# Patient Record
Sex: FEMALE | Race: WHITE | NOT HISPANIC OR LATINO
[De-identification: names, ages, dates, MRNs, and addresses within clinical notes are randomized per-mention and may not be internally consistent; named-entity substitution may affect disease eponyms.]

---

## 2017-06-16 ENCOUNTER — APPOINTMENT (OUTPATIENT)
Dept: CARDIOLOGY | Facility: CLINIC | Age: 82
End: 2017-06-16

## 2017-12-15 ENCOUNTER — APPOINTMENT (OUTPATIENT)
Dept: CARDIOLOGY | Facility: CLINIC | Age: 82
End: 2017-12-15
Payer: MEDICARE

## 2017-12-15 VITALS
DIASTOLIC BLOOD PRESSURE: 60 MMHG | WEIGHT: 148 LBS | HEART RATE: 54 BPM | BODY MASS INDEX: 27.94 KG/M2 | HEIGHT: 61 IN | SYSTOLIC BLOOD PRESSURE: 130 MMHG | OXYGEN SATURATION: 97 %

## 2017-12-15 DIAGNOSIS — Z87.19 PERSONAL HISTORY OF OTHER DISEASES OF THE DIGESTIVE SYSTEM: ICD-10-CM

## 2017-12-15 DIAGNOSIS — F41.9 ANXIETY DISORDER, UNSPECIFIED: ICD-10-CM

## 2017-12-15 DIAGNOSIS — Z86.19 PERSONAL HISTORY OF OTHER INFECTIOUS AND PARASITIC DISEASES: ICD-10-CM

## 2017-12-15 DIAGNOSIS — Z78.9 OTHER SPECIFIED HEALTH STATUS: ICD-10-CM

## 2017-12-15 DIAGNOSIS — Z85.3 PERSONAL HISTORY OF MALIGNANT NEOPLASM OF BREAST: ICD-10-CM

## 2017-12-15 PROCEDURE — 99214 OFFICE O/P EST MOD 30 MIN: CPT

## 2017-12-15 RX ORDER — METOPROLOL SUCCINATE 25 MG/1
25 TABLET, EXTENDED RELEASE ORAL
Qty: 90 | Refills: 0 | Status: ACTIVE | COMMUNITY
Start: 2017-09-20

## 2017-12-15 RX ORDER — FULVESTRANT 50 MG/ML
250 INJECTION INTRAMUSCULAR
Qty: 10 | Refills: 0 | Status: ACTIVE | COMMUNITY
Start: 2017-10-23

## 2018-06-13 ENCOUNTER — NON-APPOINTMENT (OUTPATIENT)
Age: 83
End: 2018-06-13

## 2018-06-13 ENCOUNTER — APPOINTMENT (OUTPATIENT)
Dept: CARDIOLOGY | Facility: CLINIC | Age: 83
End: 2018-06-13
Payer: MEDICARE

## 2018-06-13 VITALS
WEIGHT: 152 LBS | HEIGHT: 61 IN | BODY MASS INDEX: 28.7 KG/M2 | SYSTOLIC BLOOD PRESSURE: 130 MMHG | HEART RATE: 61 BPM | DIASTOLIC BLOOD PRESSURE: 72 MMHG

## 2018-06-13 DIAGNOSIS — E78.5 HYPERLIPIDEMIA, UNSPECIFIED: ICD-10-CM

## 2018-06-13 DIAGNOSIS — Z86.79 PERSONAL HISTORY OF OTHER DISEASES OF THE CIRCULATORY SYSTEM: ICD-10-CM

## 2018-06-13 PROCEDURE — 93000 ELECTROCARDIOGRAM COMPLETE: CPT

## 2018-06-13 PROCEDURE — 99214 OFFICE O/P EST MOD 30 MIN: CPT

## 2018-11-02 ENCOUNTER — APPOINTMENT (OUTPATIENT)
Dept: CARDIOLOGY | Facility: CLINIC | Age: 83
End: 2018-11-02
Payer: MEDICARE

## 2018-11-02 VITALS
WEIGHT: 147 LBS | HEIGHT: 61 IN | SYSTOLIC BLOOD PRESSURE: 160 MMHG | BODY MASS INDEX: 27.75 KG/M2 | OXYGEN SATURATION: 95 % | HEART RATE: 63 BPM | DIASTOLIC BLOOD PRESSURE: 72 MMHG

## 2018-11-02 PROCEDURE — 99213 OFFICE O/P EST LOW 20 MIN: CPT

## 2018-11-02 NOTE — PHYSICAL EXAM
[General Appearance - Well Developed] : well developed [Normal Appearance] : normal appearance [Well Groomed] : well groomed [General Appearance - Well Nourished] : well nourished [No Deformities] : no deformities [General Appearance - In No Acute Distress] : no acute distress [Normal Conjunctiva] : the conjunctiva exhibited no abnormalities [Eyelids - No Xanthelasma] : the eyelids demonstrated no xanthelasmas [Normal Oral Mucosa] : normal oral mucosa [No Oral Pallor] : no oral pallor [No Oral Cyanosis] : no oral cyanosis [Normal Jugular Venous A Waves Present] : normal jugular venous A waves present [Normal Jugular Venous V Waves Present] : normal jugular venous V waves present [No Jugular Venous [place] A Waves] : no jugular venous [place] A waves [Respiration, Rhythm And Depth] : normal respiratory rhythm and effort [Exaggerated Use Of Accessory Muscles For Inspiration] : no accessory muscle use [Auscultation Breath Sounds / Voice Sounds] : lungs were clear to auscultation bilaterally [Heart Rate And Rhythm] : heart rate and rhythm were normal [Heart Sounds] : normal S1 and S2 [Murmurs] : no murmurs present [Abdomen Soft] : soft [Abdomen Tenderness] : non-tender [Abdomen Mass (___ Cm)] : no abdominal mass palpated [Abnormal Walk] : normal gait [Gait - Sufficient For Exercise Testing] : the gait was sufficient for exercise testing [Nail Clubbing] : no clubbing of the fingernails [Cyanosis, Localized] : no localized cyanosis [Petechial Hemorrhages (___cm)] : no petechial hemorrhages [Skin Color & Pigmentation] : normal skin color and pigmentation [] : no rash [No Venous Stasis] : no venous stasis [Skin Lesions] : no skin lesions [No Skin Ulcers] : no skin ulcer [No Xanthoma] : no  xanthoma was observed [Oriented To Time, Place, And Person] : oriented to person, place, and time [Affect] : the affect was normal [Mood] : the mood was normal [No Anxiety] : not feeling anxious

## 2018-11-02 NOTE — REASON FOR VISIT
[Follow-Up - From Hospitalization] : follow-up of a recent hospitalization for [Hypertension] : hypertension [Medication Management] : Medication management

## 2018-11-02 NOTE — DISCUSSION/SUMMARY
[Hypertension] : hypertension [Medication Changes Per Orders] : as documented in orders [Patient] : the patient [___ Month(s)] : [unfilled] month(s) [FreeTextEntry1] : 89F w/ PMH as above presenting for follow up from hospitalization.  Currently feeling better but BP is suboptimally controlled on current regimen.  She is taking asa.\par \par 1. Continue ASA daily\par 2. Increase amlodipine to 10 mg PO daily, continue lasix 20 mg PO daily, toprol 25 mg PO daily\par 3. Continue pravastatin 40 mg PO daily\par \par RTC 1-2 months for BP med titration

## 2018-11-02 NOTE — HISTORY OF PRESENT ILLNESS
[FreeTextEntry1] : 89F w/ PMH of HTN, CAD, breast CA, GERD presenting for follow up after hospitalization for high BP and possible TIA.  Patient has been feeling well since d/c.  BP spiked all the way up to 200's systolic.  Otherwise compliant with her medications.  Imaging of her brain was unremarkable and she was sent home.

## 2018-12-05 ENCOUNTER — APPOINTMENT (OUTPATIENT)
Dept: CARDIOLOGY | Facility: CLINIC | Age: 83
End: 2018-12-05

## 2018-12-19 ENCOUNTER — APPOINTMENT (OUTPATIENT)
Dept: CARDIOLOGY | Facility: CLINIC | Age: 83
End: 2018-12-19

## 2019-05-14 ENCOUNTER — CLINICAL ADVICE (OUTPATIENT)
Age: 84
End: 2019-05-14

## 2019-10-18 ENCOUNTER — NON-APPOINTMENT (OUTPATIENT)
Age: 84
End: 2019-10-18

## 2019-10-18 ENCOUNTER — APPOINTMENT (OUTPATIENT)
Dept: CARDIOLOGY | Facility: CLINIC | Age: 84
End: 2019-10-18
Payer: MEDICARE

## 2019-10-18 VITALS
WEIGHT: 133 LBS | DIASTOLIC BLOOD PRESSURE: 80 MMHG | SYSTOLIC BLOOD PRESSURE: 162 MMHG | HEIGHT: 60 IN | BODY MASS INDEX: 26.11 KG/M2 | OXYGEN SATURATION: 98 % | HEART RATE: 64 BPM

## 2019-10-18 DIAGNOSIS — R06.09 OTHER FORMS OF DYSPNEA: ICD-10-CM

## 2019-10-18 PROCEDURE — 93000 ELECTROCARDIOGRAM COMPLETE: CPT

## 2019-10-18 PROCEDURE — 99215 OFFICE O/P EST HI 40 MIN: CPT | Mod: 25

## 2019-10-18 RX ORDER — ASPIRIN 325 MG
325 TABLET ORAL DAILY
Refills: 0 | Status: DISCONTINUED | COMMUNITY
End: 2019-10-18

## 2019-10-18 RX ORDER — ALLOPURINOL 300 MG/1
300 TABLET ORAL
Refills: 0 | Status: DISCONTINUED | COMMUNITY
End: 2019-10-18

## 2019-10-18 RX ORDER — FUROSEMIDE 20 MG/1
20 TABLET ORAL
Refills: 0 | Status: DISCONTINUED | COMMUNITY
End: 2019-10-18

## 2019-10-18 RX ORDER — PRAVASTATIN SODIUM 40 MG/1
40 TABLET ORAL
Qty: 1 | Refills: 3 | Status: ACTIVE | COMMUNITY
Start: 2017-07-25 | End: 1900-01-01

## 2019-10-18 NOTE — DISCUSSION/SUMMARY
[Hypertension] : hypertension [Medication Changes Per Orders] : as documented in orders [___ Month(s)] : [unfilled] month(s) [FreeTextEntry1] : 89F w/ PMH as above presenting for follow up.  Rather new onset dyspnea on exertion.\par \par 1. Continue ASA daily\par 2. Continue amlodipine to 10 mg PO daily, continue torsemide 5mg daily, Toprol XL 25mg daily (high risk medication with no signs of toxicity)\par 3. Continue pravastatin 40 mg PO daily and Zetia 10mg daily.\par 4. Will order echocardiogram for dyspnea.\par \par RTC 6 months.

## 2019-10-18 NOTE — PHYSICAL EXAM
[General Appearance - Well Developed] : well developed [Well Groomed] : well groomed [Normal Appearance] : normal appearance [General Appearance - Well Nourished] : well nourished [General Appearance - In No Acute Distress] : no acute distress [No Deformities] : no deformities [Normal Conjunctiva] : the conjunctiva exhibited no abnormalities [Eyelids - No Xanthelasma] : the eyelids demonstrated no xanthelasmas [No Oral Cyanosis] : no oral cyanosis [No Oral Pallor] : no oral pallor [Normal Oral Mucosa] : normal oral mucosa [Normal Jugular Venous A Waves Present] : normal jugular venous A waves present [Normal Jugular Venous V Waves Present] : normal jugular venous V waves present [No Jugular Venous [place] A Waves] : no jugular venous [place] A waves [Respiration, Rhythm And Depth] : normal respiratory rhythm and effort [Exaggerated Use Of Accessory Muscles For Inspiration] : no accessory muscle use [Auscultation Breath Sounds / Voice Sounds] : lungs were clear to auscultation bilaterally [Heart Sounds] : normal S1 and S2 [Heart Rate And Rhythm] : heart rate and rhythm were normal [Murmurs] : no murmurs present [Abdomen Mass (___ Cm)] : no abdominal mass palpated [Gait - Sufficient For Exercise Testing] : the gait was sufficient for exercise testing [Abnormal Walk] : normal gait [Nail Clubbing] : no clubbing of the fingernails [Cyanosis, Localized] : no localized cyanosis [Skin Color & Pigmentation] : normal skin color and pigmentation [Petechial Hemorrhages (___cm)] : no petechial hemorrhages [] : no rash [No Venous Stasis] : no venous stasis [Skin Lesions] : no skin lesions [No Xanthoma] : no  xanthoma was observed [No Skin Ulcers] : no skin ulcer [Affect] : the affect was normal [Oriented To Time, Place, And Person] : oriented to person, place, and time [No Anxiety] : not feeling anxious [Mood] : the mood was normal

## 2019-10-18 NOTE — HISTORY OF PRESENT ILLNESS
[FreeTextEntry1] : 89F w/ PMH of HTN, CAD, breast CA, GERD, possible TIA presenting for follow up. She has no chest pain or palpitations. Compliant with medications and reports no adverse effects. She has noticed increased SOB on moderate exertional activity. Occurred over last two weeks. Not getting any better or worse. No SOB at rest and no orthopnea.

## 2019-10-18 NOTE — REASON FOR VISIT
[Follow-Up - From Hospitalization] : follow-up of a recent hospitalization for [Medication Management] : Medication management [Hypertension] : hypertension

## 2019-10-25 ENCOUNTER — APPOINTMENT (OUTPATIENT)
Dept: CARDIOLOGY | Facility: CLINIC | Age: 84
End: 2019-10-25
Payer: MEDICARE

## 2019-10-25 PROCEDURE — 93306 TTE W/DOPPLER COMPLETE: CPT

## 2019-10-28 ENCOUNTER — APPOINTMENT (OUTPATIENT)
Dept: CARDIOLOGY | Facility: CLINIC | Age: 84
End: 2019-10-28

## 2020-02-21 RX ORDER — AMLODIPINE BESYLATE 10 MG/1
10 TABLET ORAL
Qty: 90 | Refills: 3 | Status: ACTIVE | COMMUNITY
Start: 2017-11-01 | End: 1900-01-01

## 2020-04-16 ENCOUNTER — APPOINTMENT (OUTPATIENT)
Dept: CARDIOLOGY | Facility: CLINIC | Age: 85
End: 2020-04-16
Payer: MEDICARE

## 2020-04-16 VITALS
DIASTOLIC BLOOD PRESSURE: 74 MMHG | BODY MASS INDEX: 25.52 KG/M2 | HEIGHT: 60 IN | SYSTOLIC BLOOD PRESSURE: 149 MMHG | WEIGHT: 130 LBS | HEART RATE: 72 BPM

## 2020-04-16 DIAGNOSIS — Z86.79 PERSONAL HISTORY OF OTHER DISEASES OF THE CIRCULATORY SYSTEM: ICD-10-CM

## 2020-04-16 DIAGNOSIS — I25.10 ATHEROSCLEROTIC HEART DISEASE OF NATIVE CORONARY ARTERY W/OUT ANGINA PECTORIS: ICD-10-CM

## 2020-04-16 DIAGNOSIS — Z79.899 OTHER LONG TERM (CURRENT) DRUG THERAPY: ICD-10-CM

## 2020-04-16 DIAGNOSIS — I27.20 PULMONARY HYPERTENSION, UNSPECIFIED: ICD-10-CM

## 2020-04-16 DIAGNOSIS — E78.5 HYPERLIPIDEMIA, UNSPECIFIED: ICD-10-CM

## 2020-04-16 PROCEDURE — 99443: CPT

## 2020-04-16 RX ORDER — PANTOPRAZOLE 20 MG/1
20 TABLET, DELAYED RELEASE ORAL DAILY
Refills: 0 | Status: ACTIVE | COMMUNITY

## 2020-04-16 RX ORDER — PANTOPRAZOLE 40 MG/1
40 TABLET, DELAYED RELEASE ORAL
Qty: 90 | Refills: 0 | Status: DISCONTINUED | COMMUNITY
Start: 2017-07-25 | End: 2020-04-16

## 2020-04-16 RX ORDER — NYSTATIN 100000 [USP'U]/G
100000 CREAM TOPICAL
Qty: 30 | Refills: 0 | Status: DISCONTINUED | COMMUNITY
Start: 2017-06-13 | End: 2020-04-16

## 2020-04-16 RX ORDER — NYSTATIN 100000 [USP'U]/G
100000 POWDER TOPICAL
Qty: 60 | Refills: 0 | Status: DISCONTINUED | COMMUNITY
Start: 2017-06-13 | End: 2020-04-16

## 2020-04-16 RX ORDER — MAGNESIUM OXIDE 500 MG
500 TABLET ORAL DAILY
Refills: 0 | Status: ACTIVE | COMMUNITY

## 2020-04-16 RX ORDER — ACETAMINOPHEN/DIPHENHYDRAMINE 500MG-25MG
1000 TABLET ORAL DAILY
Refills: 0 | Status: ACTIVE | COMMUNITY

## 2020-04-16 RX ORDER — LIOTHYRONINE SODIUM 5 UG/1
5 TABLET ORAL DAILY
Refills: 0 | Status: ACTIVE | COMMUNITY
Start: 2017-09-12

## 2020-04-16 RX ORDER — TORSEMIDE 5 MG/1
5 TABLET ORAL EVERY OTHER DAY
Refills: 0 | Status: ACTIVE | COMMUNITY
Start: 2017-08-22

## 2020-04-16 RX ORDER — EZETIMIBE 10 MG/1
10 TABLET ORAL DAILY
Refills: 0 | Status: ACTIVE | COMMUNITY
Start: 2017-08-27

## 2020-04-16 RX ORDER — MINOCYCLINE HYDROCHLORIDE 50 MG/1
50 CAPSULE ORAL
Refills: 0 | Status: DISCONTINUED | COMMUNITY
End: 2020-04-16

## 2020-04-16 NOTE — DISCUSSION/SUMMARY
[FreeTextEntry1] : 89-year-old female with above medical history and active medical problems as noted below\par 1.  Essential hypertension.  Mildly elevated.  Continue to follow.  She will contact me if it remains persistently greater than 150/90.  Further adjustment of medications can be done.  Otherwise continue follow-up with hematology oncology.  Present regimen of medications.  Low-salt diet.  And follow-up with me when COVID-19 crisis is over.\par 2.  Hyperlipidemia.  In presence of coronary artery disease.  Continue present regimen of medication which includes statin plus Zetia.\par 3.  Coronary artery disease.  Prior PCI.  No signs of unstable CAD.  Continue aspirin.  Continue statin therapy.  Continue management of risk factors.\par 4.  Bilateral pleural effusion.  Torsemide 5 mg.  As per discussion with her less likely to be congestive heart failure.  Preserved EF.  Normal pulmonary pressure.  Mild valvular regurgitation on the last echocardiogram.  Possible neoplastic disease cannot be ruled out.  Being followed with hematology oncology.  If needed torsemide can be increased for transudative pleural effusions.\par \par All the above were at length reviewed. Answered all the questions. Thank you very much for this kind referral. Please do not hesitate to give me a call for any question.\par Part of this transcription was done with voice recognition software and phonetically similar errors are common. I apologize for that. Please do not hesitate to call for any questions due to above.\par

## 2020-04-16 NOTE — HISTORY OF PRESENT ILLNESS
[Home] : at home, [unfilled] , at the time of the visit. [Medical Office: ([facility])___] : at the medical office located in  [FreeTextEntry1] : Consent: Patient consented to telephone visit.\par Time: A total of 25 minutes was spent in review of pertinent medical records, discussion with the patient, evaluation of patient problem, and coordination of a care plan as part of this telephone visit.\par Physical examination was not performed as a  the risk of COVID-19 cross-contamination to be greater than the benefit to the patient conferred by the physical examination.\par \par 89-year-old female who has not been seen by me in person.  Was seen by other physicians in the office.  I had a 2 way audio visit with her with extensive review of her information which includes labs and hematology/oncology consultation reports.\par At present she has stable exertional and resting shortness of breath.  But denies any chest pain.  She has no PND orthopnea pedal edema\par She has no significant palpitation dizziness or syncopal event\par She has no significant bleeding complications\par She denies any significant adverse effect from the present medications\par She has been using her torsemide 5 mg every other day\par She had recent thoracocentesis.  And had a repeat chest x-ray CT scan with hematology/oncology which is being followed\par \par Her medical history includes\par Essential hypertension.  Preserved LV systolic function.  Pulmonary hypertension.\par Coronary artery disease.  Prior PCI.  On antiplatelet agent and statin therapy\par Possible TIA no residual effect\par Breast cancer.\par Bilateral pleural effusion status post thoracocentesis.\par Chronic edema.  On low-dose of torsemide\par Gastroesophageal reflux disease\par  [Patient] : the patient

## 2020-04-16 NOTE — ASSESSMENT
[FreeTextEntry1] : Reviewed on April 16, 2020\par Echocardiogram from October 25, 2019 was reviewed\par Hematology oncology information from April 13, 2020 reviewed.\par It is scanned in the chart.

## 2020-04-16 NOTE — REVIEW OF SYSTEMS
[see HPI] : see HPI [Shortness Of Breath] : shortness of breath [Dyspnea on exertion] : dyspnea during exertion [Chest  Pressure] : no chest pressure [Chest Pain] : no chest pain [Lower Ext Edema] : lower extremity edema [Leg Claudication] : no intermittent leg claudication [Palpitations] : no palpitations [Joint Pain] : joint pain [Joint Stiffness] : joint stiffness [Negative] : Heme/Lymph

## 2020-07-08 ENCOUNTER — APPOINTMENT (OUTPATIENT)
Dept: CARDIOLOGY | Facility: CLINIC | Age: 85
End: 2020-07-08

## 2020-07-17 ENCOUNTER — OUTPATIENT (OUTPATIENT)
Dept: OUTPATIENT SERVICES | Facility: HOSPITAL | Age: 85
LOS: 1 days | End: 2020-07-17

## 2020-07-23 ENCOUNTER — OUTPATIENT (OUTPATIENT)
Dept: OUTPATIENT SERVICES | Facility: HOSPITAL | Age: 85
LOS: 1 days | End: 2020-07-23

## 2020-07-30 ENCOUNTER — OUTPATIENT (OUTPATIENT)
Dept: OUTPATIENT SERVICES | Facility: HOSPITAL | Age: 85
LOS: 1 days | End: 2020-07-30

## 2020-08-06 ENCOUNTER — OUTPATIENT (OUTPATIENT)
Dept: OUTPATIENT SERVICES | Facility: HOSPITAL | Age: 85
LOS: 1 days | End: 2020-08-06

## 2020-08-13 ENCOUNTER — OUTPATIENT (OUTPATIENT)
Dept: OUTPATIENT SERVICES | Facility: HOSPITAL | Age: 85
LOS: 1 days | End: 2020-08-13

## 2020-08-20 ENCOUNTER — OUTPATIENT (OUTPATIENT)
Dept: OUTPATIENT SERVICES | Facility: HOSPITAL | Age: 85
LOS: 1 days | End: 2020-08-20

## 2020-08-27 ENCOUNTER — OUTPATIENT (OUTPATIENT)
Dept: OUTPATIENT SERVICES | Facility: HOSPITAL | Age: 85
LOS: 1 days | End: 2020-08-27

## 2020-08-31 ENCOUNTER — OUTPATIENT (OUTPATIENT)
Dept: OUTPATIENT SERVICES | Facility: HOSPITAL | Age: 85
LOS: 1 days | End: 2020-08-31

## 2020-09-03 ENCOUNTER — OUTPATIENT (OUTPATIENT)
Dept: OUTPATIENT SERVICES | Facility: HOSPITAL | Age: 85
LOS: 1 days | End: 2020-09-03

## 2020-09-10 ENCOUNTER — OUTPATIENT (OUTPATIENT)
Dept: OUTPATIENT SERVICES | Facility: HOSPITAL | Age: 85
LOS: 1 days | End: 2020-09-10

## 2020-09-17 ENCOUNTER — OUTPATIENT (OUTPATIENT)
Dept: OUTPATIENT SERVICES | Facility: HOSPITAL | Age: 85
LOS: 1 days | End: 2020-09-17

## 2020-09-24 ENCOUNTER — OUTPATIENT (OUTPATIENT)
Dept: OUTPATIENT SERVICES | Facility: HOSPITAL | Age: 85
LOS: 1 days | End: 2020-09-24

## 2020-10-07 ENCOUNTER — OUTPATIENT (OUTPATIENT)
Dept: OUTPATIENT SERVICES | Facility: HOSPITAL | Age: 85
LOS: 1 days | End: 2020-10-07